# Patient Record
Sex: FEMALE | Race: WHITE
[De-identification: names, ages, dates, MRNs, and addresses within clinical notes are randomized per-mention and may not be internally consistent; named-entity substitution may affect disease eponyms.]

---

## 2018-05-21 ENCOUNTER — HOSPITAL ENCOUNTER (EMERGENCY)
Dept: HOSPITAL 25 - ED | Age: 78
Discharge: HOME | End: 2018-05-21
Payer: MEDICARE

## 2018-05-21 VITALS — DIASTOLIC BLOOD PRESSURE: 67 MMHG | SYSTOLIC BLOOD PRESSURE: 121 MMHG

## 2018-05-21 DIAGNOSIS — Z88.5: ICD-10-CM

## 2018-05-21 DIAGNOSIS — R11.0: ICD-10-CM

## 2018-05-21 DIAGNOSIS — R42: ICD-10-CM

## 2018-05-21 DIAGNOSIS — R63.4: ICD-10-CM

## 2018-05-21 DIAGNOSIS — R10.13: ICD-10-CM

## 2018-05-21 DIAGNOSIS — Z87.891: ICD-10-CM

## 2018-05-21 DIAGNOSIS — R53.1: Primary | ICD-10-CM

## 2018-05-21 LAB
BASOPHILS # BLD AUTO: 0.1 10^3/UL (ref 0–0.2)
EOSINOPHIL # BLD AUTO: 0 10^3/UL (ref 0–0.6)
HCT VFR BLD AUTO: 36 % (ref 35–47)
HGB BLD-MCNC: 12.8 G/DL (ref 12–16)
INR PPP/BLD: 0.93 (ref 0.77–1.02)
LYMPHOCYTES # BLD AUTO: 1.1 10^3/UL (ref 1–4.8)
MCH RBC QN AUTO: 32 PG (ref 27–31)
MCHC RBC AUTO-ENTMCNC: 36 G/DL (ref 31–36)
MCV RBC AUTO: 91 FL (ref 80–97)
MONOCYTES # BLD AUTO: 0.5 10^3/UL (ref 0–0.8)
NEUTROPHILS # BLD AUTO: 3.6 10^3/UL (ref 1.5–7.7)
NRBC # BLD AUTO: 0 10^3/UL
NRBC BLD QL AUTO: 0
PLATELET # BLD AUTO: 286 10^3/UL (ref 150–450)
RBC # BLD AUTO: 3.96 10^6/UL (ref 4–5.4)
WBC # BLD AUTO: 5.3 10^3/UL (ref 3.5–10.8)

## 2018-05-21 PROCEDURE — 36415 COLL VENOUS BLD VENIPUNCTURE: CPT

## 2018-05-21 PROCEDURE — 96374 THER/PROPH/DIAG INJ IV PUSH: CPT

## 2018-05-21 PROCEDURE — 84443 ASSAY THYROID STIM HORMONE: CPT

## 2018-05-21 PROCEDURE — 85025 COMPLETE CBC W/AUTO DIFF WBC: CPT

## 2018-05-21 PROCEDURE — 86618 LYME DISEASE ANTIBODY: CPT

## 2018-05-21 PROCEDURE — 83880 ASSAY OF NATRIURETIC PEPTIDE: CPT

## 2018-05-21 PROCEDURE — 83605 ASSAY OF LACTIC ACID: CPT

## 2018-05-21 PROCEDURE — 85730 THROMBOPLASTIN TIME PARTIAL: CPT

## 2018-05-21 PROCEDURE — 80053 COMPREHEN METABOLIC PANEL: CPT

## 2018-05-21 PROCEDURE — 99282 EMERGENCY DEPT VISIT SF MDM: CPT

## 2018-05-21 PROCEDURE — 85610 PROTHROMBIN TIME: CPT

## 2018-05-21 PROCEDURE — 83735 ASSAY OF MAGNESIUM: CPT

## 2018-05-21 PROCEDURE — 84484 ASSAY OF TROPONIN QUANT: CPT

## 2018-05-21 PROCEDURE — 86140 C-REACTIVE PROTEIN: CPT

## 2018-05-22 NOTE — ED
Complex/Multi-Sys Presentation





- HPI Summary


HPI Summary: 





patient is a 77-year-old female presenting with multiple complaints.  She 

states the past year or 2 she has been feeling weak, tired, intermittent 

dizziness, early satiety, nausea without vomiting with some upper epigastric 

pain.  She also states she has lost 20 pounds in the past year and a half.  She 

states she has lost quality of life and stays in bed frequently.  She has been 

seen by her PCP here in [place] as well as ENT for dizziness and GI with an 

endoscopy and colonoscopy.  She also states she believes she has had MRIs and 

CAT scans with no findings.  She was placed on Prozac last week which worsened 

her nausea/vomiting symptoms.  She stopped taking this 2 days ago.  She denies 

any UTI symptoms.  She states she awakens with nausea.  Denies any fevers, 

sweats, chills.  She has never been placed on a PPI for her abdominal pain and 

nausea.  Her endoscopy showed "changes" but nothing definitive to place her on 

medication she states however, GI stated they would like a follow-up endoscopy 

in 6 months time.  This was approximately 6 months ago at this time.  She says 

she travels back and forth between [place] and [place] and has physicians in 

each area.  The reason she comes to the ED today is to get another opinion.  





- History Of Current Complaint


Chief Complaint: EDGeneral


Time Seen by Provider: 05/21/18 11:42


Hx Obtained From: Patient


Onset/Duration: Gradual Onset


Timing: Constant


Severity Currently: Mild


Severity Initially: Mild


Associated Signs And Symptoms: Positive: Weakness, Nausea, Abdominal Pain, 

Decreased Oral Intake, Recent Medication Changes.  Negative: Immunocompromised





- Allergies/Home Medications


Allergies/Adverse Reactions: 


 Allergies











Allergy/AdvReac Type Severity Reaction Status Date / Time


 


codeine AdvReac Intermediate Altered Verified 05/21/18 12:22





   Mental  





   Status  











Home Medications: 


 Home Medications





Calcium Carbonate/Vitamin D3 [Calcium 600 + Vit D Tablet] 1 tab PO DAILY 05/21/ 18 [History Confirmed 05/21/18]


Cyclosporine 0.05% OPHTH (NF) [Restasis 0.05% OPHTH] 1 drop BOTH EYES BID 05/21/ 18 [History Confirmed 05/21/18]


FLUoxetine CAP* [PROzac CAP*] 20 mg PO DAILY 05/21/18 [History Confirmed 05/21/ 18]


Ibandronate TAB(NF) [Boniva(NF)] 150 mg PO MONTHLY 05/21/18 [History Confirmed 

05/21/18]


Omeprazole CAP* [Prilosec CAP* 20 MG] 40 mg PO DAILY 05/21/18 [History 

Confirmed 05/21/18]


clonazePAM TAB(*) [KlonoPIN TAB(*)] 0.5 - 1 mg PO BID 05/21/18 [History 

Confirmed 05/21/18]











PMH/Surg Hx/FS Hx/Imm Hx


Previously Healthy: Yes





- Immunization History


Hx Pertussis Vaccination: No


Immunizations Up to Date: Unable to Obtain/Confirm


Infectious Disease History: Yes


Infectious Disease History: 


   Denies: Traveled Outside the [place] in Last 30 Days





- Family History


Known Family History: 


   Negative: Cardiac Disease, Hypertension, Diabetes





- Social History


Occupation: Unemployed, Retired -  all


Lives: With Family


Alcohol Use: Occasionally


Hx Substance Use: Yes


Substance Use Type: Reports: Prescribed


Hx Tobacco Use: Yes


Smoking Status (MU): Former Smoker





Review of Systems


Constitutional: Negative


Positive: Fatigue.  Negative: Fever, Chills


Negative: Palpitations, Chest Pain


Negative: Shortness Of Breath, Cough


Positive: Abdominal Pain, Nausea


Genitourinary: Negative


Positive: no symptoms reported, see HPI


Positive: Weakness


Psychological: Normal


All Other Systems Reviewed And Are Negative: Yes





Physical Exam


Triage Information Reviewed: Yes


Vital Signs On Initial Exam: 


 Initial Vitals











Temp Pulse Resp BP Pulse Ox


 


 98.4 F   85   20   131/75   96 


 


 05/21/18 11:09  05/21/18 11:09  05/21/18 11:09  05/21/18 11:09  05/21/18 11:09











Vital Signs Reviewed: Yes


Appearance: Positive: Well-Appearing, No Pain Distress


Skin: Positive: Warm, Skin Color Reflects Adequate Perfusion


Head/Face: Positive: Normal Head/Face Inspection


Eyes: Positive: EOMI, SAEID


Neck: Positive: Supple, No Lymphadenopathy


Respiratory/Lung Sounds: Positive: Clear to Auscultation, Breath Sounds Present


Cardiovascular: Positive: RRR, Pulses are Symmetrical in both Upper and Lower 

Extremities


Musculoskeletal: Positive: Normal, Strength/ROM Intact


Neurological: Positive: Speech Normal


Psychiatric: Positive: Normal





Diagnostics





- Vital Signs


 Vital Signs











  Temp Pulse Resp BP Pulse Ox


 


 05/21/18 14:13  98.8 F  78  16  121/67  99


 


 05/21/18 14:03     121/67 


 


 05/21/18 14:00   78    98


 


 05/21/18 13:00   70    99


 


 05/21/18 12:13   75   136/74  98


 


 05/21/18 12:00   78    98


 


 05/21/18 11:43   80   136/75  97


 


 05/21/18 11:42   77    97


 


 05/21/18 11:09  98.4 F  85  20  131/75  96














- Laboratory


Lab Results: 


 Lab Results











  05/21/18 05/21/18 05/21/18 Range/Units





  12:22 12:22 12:22 


 


WBC  5.3    (3.5-10.8)  10^3/ul


 


RBC  3.96 L    (4.0-5.4)  10^6/ul


 


Hgb  12.8    (12.0-16.0)  g/dl


 


Hct  36    (35-47)  %


 


MCV  91    (80-97)  fL


 


MCH  32 H    (27-31)  pg


 


MCHC  36    (31-36)  g/dl


 


RDW  14    (10.5-15)  %


 


Plt Count  286    (150-450)  10^3/ul


 


MPV  7.7    (7.4-10.4)  um3


 


Neut % (Auto)  67.6    (38-83)  %


 


Lymph % (Auto)  21.5 L    (25-47)  %


 


Mono % (Auto)  9.1 H    (0-7)  %


 


Eos % (Auto)  0.7    (0-6)  %


 


Baso % (Auto)  1.1    (0-2)  %


 


Absolute Neuts (auto)  3.6    (1.5-7.7)  10^3/ul


 


Absolute Lymphs (auto)  1.1    (1.0-4.8)  10^3/ul


 


Absolute Monos (auto)  0.5    (0-0.8)  10^3/ul


 


Absolute Eos (auto)  0    (0-0.6)  10^3/ul


 


Absolute Basos (auto)  0.1    (0-0.2)  10^3/ul


 


Absolute Nucleated RBC  0    10^3/ul


 


Nucleated RBC %  0    


 


INR (Anticoag Therapy)   0.93   (0.77-1.02)  


 


APTT   30.9   (26.0-36.3)  seconds


 


Sodium    140  (139-145)  mmol/L


 


Potassium    3.8  (3.5-5.0)  mmol/L


 


Chloride    106  (101-111)  mmol/L


 


Carbon Dioxide    27  (22-32)  mmol/L


 


Anion Gap    7  (2-11)  mmol/L


 


BUN    10  (6-24)  mg/dL


 


Creatinine    0.54  (0.51-0.95)  mg/dL


 


Est GFR ( Amer)    141.2  (>60)  


 


Est GFR (Non-Af Amer)    109.8  (>60)  


 


BUN/Creatinine Ratio    18.5  (8-20)  


 


Glucose    99  ()  mg/dL


 


Lactic Acid     (0.5-2.0)  mmol/L


 


Calcium    9.3  (8.6-10.3)  mg/dL


 


Magnesium    2.0  (1.9-2.7)  mg/dL


 


Total Bilirubin    0.40  (0.2-1.0)  mg/dL


 


AST    14  (13-39)  U/L


 


ALT    9  (7-52)  U/L


 


Alkaline Phosphatase    51  ()  U/L


 


Troponin I    0.00  (<0.04)  ng/mL


 


C-Reactive Protein    < 1.00  (< 5.00)  mg/L


 


B-Natriuretic Peptide    ( - 100) pg/mL


 


Total Protein    6.5  (6.4-8.9)  g/dL


 


Albumin    3.9  (3.2-5.2)  g/dL


 


Globulin    2.6  (2-4)  g/dL


 


Albumin/Globulin Ratio    1.5  (1-3)  


 


TSH    0.99  (0.34-5.60)  mcIU/mL














  05/21/18 05/21/18 Range/Units





  12:22 12:22 


 


WBC    (3.5-10.8)  10^3/ul


 


RBC    (4.0-5.4)  10^6/ul


 


Hgb    (12.0-16.0)  g/dl


 


Hct    (35-47)  %


 


MCV    (80-97)  fL


 


MCH    (27-31)  pg


 


MCHC    (31-36)  g/dl


 


RDW    (10.5-15)  %


 


Plt Count    (150-450)  10^3/ul


 


MPV    (7.4-10.4)  um3


 


Neut % (Auto)    (38-83)  %


 


Lymph % (Auto)    (25-47)  %


 


Mono % (Auto)    (0-7)  %


 


Eos % (Auto)    (0-6)  %


 


Baso % (Auto)    (0-2)  %


 


Absolute Neuts (auto)    (1.5-7.7)  10^3/ul


 


Absolute Lymphs (auto)    (1.0-4.8)  10^3/ul


 


Absolute Monos (auto)    (0-0.8)  10^3/ul


 


Absolute Eos (auto)    (0-0.6)  10^3/ul


 


Absolute Basos (auto)    (0-0.2)  10^3/ul


 


Absolute Nucleated RBC    10^3/ul


 


Nucleated RBC %    


 


INR (Anticoag Therapy)    (0.77-1.02)  


 


APTT    (26.0-36.3)  seconds


 


Sodium    (139-145)  mmol/L


 


Potassium    (3.5-5.0)  mmol/L


 


Chloride    (101-111)  mmol/L


 


Carbon Dioxide    (22-32)  mmol/L


 


Anion Gap    (2-11)  mmol/L


 


BUN    (6-24)  mg/dL


 


Creatinine    (0.51-0.95)  mg/dL


 


Est GFR ( Amer)    (>60)  


 


Est GFR (Non-Af Amer)    (>60)  


 


BUN/Creatinine Ratio    (8-20)  


 


Glucose    ()  mg/dL


 


Lactic Acid  0.8   (0.5-2.0)  mmol/L


 


Calcium    (8.6-10.3)  mg/dL


 


Magnesium    (1.9-2.7)  mg/dL


 


Total Bilirubin    (0.2-1.0)  mg/dL


 


AST    (13-39)  U/L


 


ALT    (7-52)  U/L


 


Alkaline Phosphatase    ()  U/L


 


Troponin I    (<0.04)  ng/mL


 


C-Reactive Protein    (< 5.00)  mg/L


 


B-Natriuretic Peptide   30 ( - 100) pg/mL


 


Total Protein    (6.4-8.9)  g/dL


 


Albumin    (3.2-5.2)  g/dL


 


Globulin    (2-4)  g/dL


 


Albumin/Globulin Ratio    (1-3)  


 


TSH    (0.34-5.60)  mcIU/mL











Result Diagrams: 


 05/21/18 12:22





 05/21/18 12:22


Lab Statement: Any lab studies that have been ordered have been reviewed, and 

results considered in the medical decision making process.





Complex Multi-Symp Course/Dx


Course Of Treatment: Patient is evaluated for a variety of complaints.  She 

endorses a mild amount of nausea and some epigastric tenderness which has all 

been present for approximately a year and a half.  She states she has decreased 

appetite and early satiety.  + Pound weight loss over the past year and a half.

  I discussed with patient and assured her she is doing all the right things by 

following up with ENT, GI and PCP to assess her multiple complaints/condition.  

I have discussed obtaining an endoscopy, colonoscopy and CAT scan would be done 

on an outpatient basis.  However, due to her nausea and epigastric tenderness 

will start on PPI and have her follow up here with our ENT.  Also, I have given 

her ODT Zofran for her nausea.  I have discussed at length with patient that I 

will be unlikely to discern the causes for her symptoms.  20 pound weight loss 

is concerning to me and I have advised she seek out one of our GI specialists.  

She denies any tick bites or rash, however she states she had a history of Lyme 

and believes this may be contributory.  I will have her follow-up with Dr. Steven as well.  Labs are obtained and are unremarkable.   is at 

bedside and both patient and  are okay with plan.





- Diagnoses


Provider Diagnoses: 


 Weakness, Weight loss








Discharge





- Sign-Out/Discharge


Documenting (check all that apply): Discharge/Admit/Transfer





- Discharge Plan


Condition: Stable


Disposition: HOME


Prescriptions: 


Omeprazole 40 mg PO DAILY #30 capsule.


Ondansetron ODT TAB* [Zofran 4 MG Odt TAB*] 4 mg PO Q6H PRN #30 tab.odt MDD 4


 PRN Reason: Nausea


Referrals: 


Kwabena GAMA,Derik JACKSON [Medical Doctor] - 


Cryer,Jonathan, MD [Medical Doctor] - 


Gianni Cuadra MD [Primary Care Provider] - 


Ismael Cheung MD [Medical Doctor] - 


Additional Instructions: 


Please follow-up with Dr. Cheung for a follow-up endoscopy


Please follow-up with Dr. Steven to discuss the results of the Lyme test


Also talked to her doctor about your significant weight loss


Omeprazole once daily 30 days, if this improves her symptoms, this is an over-

the-counter medication, however please talk to Dr. Cheung about this


Zofran may be used to 4 times daily for nausea





- Billing Disposition and Condition


Condition: STABLE


Disposition: HOME